# Patient Record
Sex: FEMALE | Race: WHITE | URBAN - METROPOLITAN AREA
[De-identification: names, ages, dates, MRNs, and addresses within clinical notes are randomized per-mention and may not be internally consistent; named-entity substitution may affect disease eponyms.]

---

## 2021-11-03 ENCOUNTER — OFFICE VISIT (OUTPATIENT)
Dept: URBAN - METROPOLITAN AREA CLINIC 19 | Facility: CLINIC | Age: 31
End: 2021-11-03
Payer: COMMERCIAL

## 2021-11-03 ENCOUNTER — WEB ENCOUNTER (OUTPATIENT)
Dept: URBAN - METROPOLITAN AREA CLINIC 19 | Facility: CLINIC | Age: 31
End: 2021-11-03

## 2021-11-03 DIAGNOSIS — K92.1 HEMATOCHEZIA: ICD-10-CM

## 2021-11-03 DIAGNOSIS — K64.1 GRADE II HEMORRHOIDS: ICD-10-CM

## 2021-11-03 DIAGNOSIS — K62.89 RECTAL PAIN: ICD-10-CM

## 2021-11-03 DIAGNOSIS — K60.2 ANAL FISSURE: ICD-10-CM

## 2021-11-03 PROBLEM — 428283002 HISTORY OF POLYP OF COLON: Status: ACTIVE | Noted: 2021-11-03

## 2021-11-03 PROCEDURE — 46611 ANOSCOPY: CPT | Performed by: INTERNAL MEDICINE

## 2021-11-03 PROCEDURE — 99203 OFFICE O/P NEW LOW 30 MIN: CPT | Performed by: INTERNAL MEDICINE

## 2021-11-03 PROCEDURE — 99243 OFF/OP CNSLTJ NEW/EST LOW 30: CPT | Performed by: INTERNAL MEDICINE

## 2021-11-03 RX ORDER — LORATADINE 10 MG
1 PACKET MIXED WITH 8 OUNCES OF FLUID TABLET,DISINTEGRATING ORAL ONCE A DAY
Qty: 30 | OUTPATIENT
Start: 2021-11-03 | End: 2021-12-02

## 2021-11-03 NOTE — PHYSICAL EXAM GASTROINTESTINAL
Abdomen , soft, nontender, nondistended , no guarding or rigidity , no masses palpable , normal bowel sounds , Liver and Spleen , no hepatomegaly present , no hepatosplenomegaly , liver nontender , spleen not palpable.  Rectal:  normal external exam.  lisbeth with signs of anterior anal fissure.  anoscopy with visualized 3mm ant anal fissure.  grade II internal hemorrhoids.

## 2021-11-03 NOTE — HPI-TODAY'S VISIT:
pt referred by reyna Dent for hematochezia.  a copy of this note will be sent to referring physician  pt delivered her son 9 weeks ago which was unremarkable with no tears. it was vaginal delivary and was fast ( 45 mins of pushing)  notes 2-3 weeks of rectal pain and bleeding with bm notes normal stools with no jessica constipation or hard stools told to take stool softners  by her gyncologist with improved but ongoinig symptoms blood just on wiping and in the toilets normal brown stools no dizziness, lightheadedness or syncope no prior colonoscopy no fam hx of colon cancer or colon polyps  no diarrhea or constipation  no other complaints

## 2021-11-04 LAB
C-REACTIVE PROTEIN, QUANT: 2
HEMATOCRIT: 42.7
HEMOGLOBIN: 14
MCH: 30.1
MCHC: 32.8
MCV: 92
NRBC: (no result)
PLATELETS: 327
RBC: 4.65
RDW: 12.1
WBC: 7.1

## 2022-01-12 ENCOUNTER — OFFICE VISIT (OUTPATIENT)
Dept: URBAN - METROPOLITAN AREA CLINIC 19 | Facility: CLINIC | Age: 32
End: 2022-01-12
Payer: COMMERCIAL

## 2022-01-12 VITALS
WEIGHT: 134.8 LBS | HEIGHT: 64 IN | HEART RATE: 65 BPM | SYSTOLIC BLOOD PRESSURE: 119 MMHG | TEMPERATURE: 98.1 F | BODY MASS INDEX: 23.01 KG/M2 | DIASTOLIC BLOOD PRESSURE: 82 MMHG

## 2022-01-12 DIAGNOSIS — K62.89 RECTAL PAIN: ICD-10-CM

## 2022-01-12 DIAGNOSIS — K92.1 HEMATOCHEZIA: ICD-10-CM

## 2022-01-12 DIAGNOSIS — K59.00 CONSTIPATION, UNSPECIFIED CONSTIPATION TYPE: ICD-10-CM

## 2022-01-12 DIAGNOSIS — K60.2 ANAL FISSURE: ICD-10-CM

## 2022-01-12 PROBLEM — 14760008: Status: ACTIVE | Noted: 2022-01-12

## 2022-01-12 PROCEDURE — 99213 OFFICE O/P EST LOW 20 MIN: CPT | Performed by: INTERNAL MEDICINE

## 2022-01-12 NOTE — HPI-TODAY'S VISIT:
pt referred by reyna Dent for hematochezia attributed to anal fissure she was seen for it previously and given stool softener and anal fissure cream she comes in for follow up. a copy of this note will be sent to referring physician  she is asymptomatic now notes normal stools on miralax no further rectal pain or bleeding still has 1 month of cream left no abd pain no n/v no rectal itching labs include normal cbc and crp in 11/2021.   no new complaints.  no fam hx of colon cancer or colon polyps

## 2024-01-03 ENCOUNTER — OFFICE VISIT (OUTPATIENT)
Dept: URBAN - METROPOLITAN AREA CLINIC 80 | Facility: CLINIC | Age: 34
End: 2024-01-03
Payer: COMMERCIAL

## 2024-01-03 VITALS
TEMPERATURE: 97.9 F | HEIGHT: 64 IN | WEIGHT: 132.6 LBS | SYSTOLIC BLOOD PRESSURE: 103 MMHG | DIASTOLIC BLOOD PRESSURE: 66 MMHG | BODY MASS INDEX: 22.64 KG/M2 | HEART RATE: 62 BPM

## 2024-01-03 DIAGNOSIS — K60.2 ANAL FISSURE: ICD-10-CM

## 2024-01-03 DIAGNOSIS — K92.1 HEMATOCHEZIA: ICD-10-CM

## 2024-01-03 DIAGNOSIS — K62.89 RECTAL PAIN: ICD-10-CM

## 2024-01-03 DIAGNOSIS — K64.8 INTERNAL HEMORRHOIDS: ICD-10-CM

## 2024-01-03 PROCEDURE — 99213 OFFICE O/P EST LOW 20 MIN: CPT | Performed by: PHYSICIAN ASSISTANT

## 2024-01-03 NOTE — PHYSICAL EXAM GASTROINTESTINAL
Abdomen, soft, nontender, nondistended, no guarding or rigidity, no masses palpable, normal bowel sounds, Liver and Spleen, no hepatomegaly present, no hepatosplenomegaly, liver nontenderRectal, internal hemorrhoids noted

## 2024-01-03 NOTE — HPI-TODAY'S VISIT:
Pt has hx of fissures in past - happened after she delivered 1st child just had second baby 3 months ago  bleeding started up again about 2 weeks ago  no constant - every few days did have pain when she saw the bleeding  the pain is not as bad as before has not tried using anything yet for it normal bowel habit is 1 BM a day - no change in it vaginal delivery has started to take some Miralax again like Dr. Weems said in past

## 2024-01-30 ENCOUNTER — DASHBOARD ENCOUNTERS (OUTPATIENT)
Age: 34
End: 2024-01-30

## 2024-02-01 ENCOUNTER — OV EP (OUTPATIENT)
Dept: URBAN - METROPOLITAN AREA CLINIC 19 | Facility: CLINIC | Age: 34
End: 2024-02-01